# Patient Record
(demographics unavailable — no encounter records)

---

## 2025-02-16 NOTE — ASSESSMENT
[FreeTextEntry1] : Elevated PSA He needs a prostate biopsy But will need to see MRI report and obtain complete images if PIRADS 3 and above Discussed prostate biopsy via transperineal approach risks of bleeding, hematuria, pain, infection, hematospermia, rectal bleeding, all discussed instructions for prostate biopsy will be sent to patient  BPH: Uroflow and PVR suggest good urine flow and good emptying objectively mild LUTS unfavorable side effects from flomax other selective alpha blockers expected to have similar side effects he can try OTC [place]--he should obtain from reputable source  ED: explained mechanism of vasculogenic and age-related ED options to tried PDE5 inhibitors discussed potential side effects discussed he will think about the options presented and will let me know if he wishes to pursue

## 2025-02-16 NOTE — HISTORY OF PRESENT ILLNESS
[FreeTextEntry1] : 69 y/o man (1) Elevated PSA: normally 3-4 range, but increased to 7.9 in 2024, and now 9.1 on repeat 1/14/2025 ([facility]) Able to access [facility] for latest PSA levels MRI done but report not available today. pt thinks it was PIRADS 2--I called for report [facility] for report Only 681 coronal images available on disk that patient brought with him  (2) BPH c/o weak stream, not as forceful as before feels like he empties when he voids nocturia x 2 no daytime frequency was on Flomax or similar medication--but he had lethargy every morning, so he stopped taking it  Uroflow today: peak flow: 14.1 cc/sec voided vol 149 cc  PVR 0cc  (3) ED 50% full erection progressively worse over the last 12 months Has not tried any medications, afraid of side effects  Surgical hx: Colonoscopy Medical hx: ED, BPH, Elevated PSA Allergies: NKDA Social: Alcohol-never alcohol, Smoking-never smoker, Drug-never drug use, Occupation: retired, worked in health insurance industry Family hx: no fam hx of  cancers Medications: none

## 2025-02-16 NOTE — END OF VISIT
[Time Spent: ___ minutes] : I have spent [unfilled] minutes of time on the encounter which excludes teaching and separately reported services.
[Time Spent: ___ minutes] : I have spent [unfilled] minutes of time on the encounter which excludes teaching and separately reported services.
labor/delivery

## 2025-02-16 NOTE — ASSESSMENT
[FreeTextEntry1] : Elevated PSA He needs a prostate biopsy But will need to see MRI report and obtain complete images if PIRADS 3 and above Discussed prostate biopsy via transperineal approach risks of bleeding, hematuria, pain, infection, hematospermia, rectal bleeding, all discussed instructions for prostate biopsy will be sent to patient  BPH: Uroflow and PVR suggest good urine flow and good emptying objectively mild LUTS unfavorable side effects from flomax other selective alpha blockers expected to have similar side effects he can try OTC [place]--he should obtain from reputable source  ED: explained mechanism of vasculogenic and age-related ED options to tried PDE5 inhibitors discussed potential side effects discussed he will think about the options presented and will let me know if he wishes to pursue

## 2025-02-16 NOTE — HISTORY OF PRESENT ILLNESS
[FreeTextEntry1] : 67 y/o man (1) Elevated PSA: normally 3-4 range, but increased to 7.9 in 2024, and now 9.1 on repeat 1/14/2025 ([facility]) Able to access [facility] for latest PSA levels MRI done but report not available today. pt thinks it was PIRADS 2--I called for report [facility] for report Only 681 coronal images available on disk that patient brought with him  (2) BPH c/o weak stream, not as forceful as before feels like he empties when he voids nocturia x 2 no daytime frequency was on Flomax or similar medication--but he had lethargy every morning, so he stopped taking it  Uroflow today: peak flow: 14.1 cc/sec voided vol 149 cc  PVR 0cc  (3) ED 50% full erection progressively worse over the last 12 months Has not tried any medications, afraid of side effects  Surgical hx: Colonoscopy Medical hx: ED, BPH, Elevated PSA Allergies: NKDA Social: Alcohol-never alcohol, Smoking-never smoker, Drug-never drug use, Occupation: retired, worked in health insurance industry Family hx: no fam hx of  cancers Medications: none

## 2025-03-20 NOTE — PHYSICAL EXAM
[Normal Appearance] : normal appearance [General Appearance - Well Developed] : well developed [] : no respiratory distress [Oriented To Time, Place, And Person] : oriented to person, place, and time [Affect] : the affect was normal

## 2025-03-27 NOTE — REASON FOR VISIT
[Other Location: e.g. School (Enter Location, City,State)___] : at [unfilled], at the time of the visit. [Medical Office: ([facility])___] : at the medical office located in  [Telehealth (audio & video)] : This visit was provided via telehealth using real-time 2-way audio visual technology. [Verbal consent obtained from patient] : the patient, [unfilled] [Follow-up Visit ___] : a follow-up visit  for [unfilled] [This encounter was initiated by telehealth (audio with video) and converted to telephone (audio only)] : This encounter was initiated by telehealth (audio with video) and converted to telephone (audio only) [Technical] : patient unable to effectively utilize tele-video due to technical issues.

## 2025-03-27 NOTE — HISTORY OF PRESENT ILLNESS
[FreeTextEntry1] : TOM DEWEY is a 68 year-old man with a history of PSA elevation, it was last checked in January and was 9.1ng/mL, prior in 2024 it was 7.4ng/mL. He had a prostate MRI performed in December which showed a prostate volume of 33mL with no MRI targetable lesions, moderate BPH. It is recommended that he undergo a non-fusion biopsy.    Denies any family history of prostate cancer.   He reports a weaker stream. Denies any other bothersome urinary symptoms.

## 2025-03-27 NOTE — ASSESSMENT
[FreeTextEntry1] : Reviewed prostate MRI with patient. A biopsy is recommended due to elevated PSA density.    Discussed transperineal fusion guided biopsy with the patient today. Explained to patient that the MRI images and transrectal ultrasound images allows us to retrieve biopsy samples. We will also take samples from a 12 core biopsy template of the prostate to assess for the presence of clinically significant cancer. Discussed the use of local anesthesia for this procedure. Reviewed the importance of a fleet enema the morning of the procedure. Discussed with patient that a transperineal approach has a low risk for infection. Discussed risks and benefits of a transperineal fusion biopsy.

## 2025-03-27 NOTE — REASON FOR VISIT
[Other Location: e.g. School (Enter Location, City,State)___] : at [unfilled], at the time of the visit. [Medical Office: ([facility])___] : at the medical office located in  [Telehealth (audio & video)] : This visit was provided via telehealth using real-time 2-way audio visual technology. [Verbal consent obtained from patient] : the patient, [unfilled] [Follow-up Visit ___] : a follow-up visit  for [unfilled] [This encounter was initiated by telehealth (audio with video) and converted to telephone (audio only)] : This encounter was initiated by telehealth (audio with video) and converted to telephone (audio only) [Technical] : patient unable to effectively utilize tele-video due to technical issues.